# Patient Record
Sex: MALE | Race: WHITE | NOT HISPANIC OR LATINO | Employment: FULL TIME | URBAN - NONMETROPOLITAN AREA
[De-identification: names, ages, dates, MRNs, and addresses within clinical notes are randomized per-mention and may not be internally consistent; named-entity substitution may affect disease eponyms.]

---

## 2018-07-23 ENCOUNTER — OFFICE VISIT (OUTPATIENT)
Dept: FAMILY MEDICINE CLINIC | Facility: CLINIC | Age: 28
End: 2018-07-23

## 2018-07-23 VITALS
SYSTOLIC BLOOD PRESSURE: 112 MMHG | HEIGHT: 70 IN | WEIGHT: 211 LBS | BODY MASS INDEX: 30.21 KG/M2 | HEART RATE: 60 BPM | DIASTOLIC BLOOD PRESSURE: 68 MMHG

## 2018-07-23 DIAGNOSIS — M10.071 ACUTE IDIOPATHIC GOUT INVOLVING TOE OF RIGHT FOOT: Primary | ICD-10-CM

## 2018-07-23 PROCEDURE — 99213 OFFICE O/P EST LOW 20 MIN: CPT | Performed by: INTERNAL MEDICINE

## 2018-07-23 RX ORDER — COLCHICINE 0.6 MG/1
TABLET ORAL
Qty: 20 TABLET | Refills: 0 | Status: SHIPPED | OUTPATIENT
Start: 2018-07-23 | End: 2018-12-10

## 2018-07-23 NOTE — PATIENT INSTRUCTIONS
Low-Purine Diet  Purines are compounds that affect the level of uric acid in your body. A low-purine diet is a diet that is low in purines. Eating a low-purine diet can prevent the level of uric acid in your body from getting too high and causing gout or kidney stones or both.  What do I need to know about this diet?  · Choose low-purine foods. Examples of low-purine foods are listed in the next section.  · Drink plenty of fluids, especially water. Fluids can help remove uric acid from your body. Try to drink 8-16 cups (1.9-3.8 L) a day.  · Limit foods high in fat, especially saturated fat, as fat makes it harder for the body to get rid of uric acid. Foods high in saturated fat include pizza, cheese, ice cream, whole milk, fried foods, and gravies. Choose foods that are lower in fat and lean sources of protein. Use olive oil when cooking as it contains healthy fats that are not high in saturated fat.  · Limit alcohol. Alcohol interferes with the elimination of uric acid from your body. If you are having a gout attack, avoid all alcohol.  · Keep in mind that different people’s bodies react differently to different foods. You will probably learn over time which foods do or do not affect you. If you discover that a food tends to cause your gout to flare up, avoid eating that food. You can more freely enjoy foods that do not cause problems. If you have any questions about a food item, talk to your dietitian or health care provider.  Which foods are low, moderate, and high in purines?  The following is a list of foods that are low, moderate, and high in purines. You can eat any amount of the foods that are low in purines. You may be able to have small amounts of foods that are moderate in purines. Ask your health care provider how much of a food moderate in purines you can have. Avoid foods high in purines.  Grains  · Foods low in purines: Enriched white bread, pasta, rice, cake, cornbread, popcorn.  · Foods moderate in  purines: Whole-grain breads and cereals, wheat germ, bran, oatmeal. Uncooked oatmeal. Dry wheat bran or wheat germ.  · Foods high in purines: Pancakes, [place] toast, biscuits, muffins.  Vegetables  · Foods low in purines: All vegetables, except those that are moderate in purines.  · Foods moderate in purines: Asparagus, cauliflower, spinach, mushrooms, green peas.  Fruits  · All fruits are low in purines.  Meats and other Protein Foods  · Foods low in purines: Eggs, nuts, peanut butter.  · Foods moderate in purines: 80-90% lean beef, lamb, veal, pork, poultry, fish, eggs, peanut butter, nuts. Crab, lobster, oysters, and shrimp. Cooked dried beans, peas, and lentils.  · Foods high in purines: Anchovies, sardines, herring, mussels, tuna, codfish, scallops, trout, and breonna. Harper. Organ meats (such as liver or kidney). Tripe. Game meat. Goose. Sweetbreads.  Dairy  · All dairy foods are low in purines. Low-fat and fat-free dairy products are best because they are low in saturated fat.  Beverages  · Drinks low in purines: Water, carbonated beverages, tea, coffee, cocoa.  · Drinks moderate in purines: Soft drinks and other drinks sweetened with high-fructose corn syrup. Juices. To find whether a food or drink is sweetened with high-fructose corn syrup, look at the ingredients list.  · Drinks high in purines: Alcoholic beverages (such as beer).  Condiments  · Foods low in purines: Salt, herbs, olives, pickles, relishes, vinegar.  · Foods moderate in purines: Butter, margarine, oils, mayonnaise.  Fats and Oils  · Foods low in purines: All types, except gravies and sauces made with meat.  · Foods high in purines: Gravies and sauces made with meat.  Other Foods  · Foods low in purines: Sugars, sweets, gelatin. Cake. Soups made without meat.  · Foods moderate in purines: Meat-based or fish-based soups, broths, or bouillons. Foods and drinks sweetened with high-fructose corn syrup.  · Foods high in purines: High-fat desserts  (such as ice cream, cookies, cakes, pies, doughnuts, and chocolate).  Contact your dietitian for more information on foods that are not listed here.  This information is not intended to replace advice given to you by your health care provider. Make sure you discuss any questions you have with your health care provider.  Document Released: 04/13/2012 Document Revised: 05/25/2017 Document Reviewed: 11/24/2014  [email or web address] Interactive Patient Education © 2017 [employer]    Exercising to Lose Weight  Exercising can help you to lose weight. In order to lose weight through exercise, you need to do vigorous-intensity exercise. You can tell that you are exercising with vigorous intensity if you are breathing very hard and fast and cannot hold a conversation while exercising.  Moderate-intensity exercise helps to maintain your current weight. You can tell that you are exercising at a moderate level if you have a higher heart rate and faster breathing, but you are still able to hold a conversation.  How often should I exercise?  Choose an activity that you enjoy and set realistic goals. Your health care provider can help you to make an activity plan that works for you. Exercise regularly as directed by your health care provider. This may include:  · Doing resistance training twice each week, such as:  ? Push-ups.  ? Sit-ups.  ? Lifting weights.  ? Using resistance bands.  · Doing a given intensity of exercise for a given amount of time. Choose from these options:  ? 150 minutes of moderate-intensity exercise every week.  ? 75 minutes of vigorous-intensity exercise every week.  ? A mix of moderate-intensity and vigorous-intensity exercise every week.    Children, pregnant women, people who are out of shape, people who are overweight, and older adults may need to consult a health care provider for individual recommendations. If you have any sort of medical condition, be sure to consult your health care provider before starting  a new exercise program.  What are some activities that can help me to lose weight?  · Walking at a rate of at least 4.5 miles an hour.  · Jogging or running at a rate of 5 miles per hour.  · Biking at a rate of at least 10 miles per hour.  · Lap swimming.  · Roller-skating or in-line skating.  · Cross-country skiing.  · Vigorous competitive sports, such as football, basketball, and soccer.  · Jumping rope.  · Aerobic dancing.  How can I be more active in my day-to-day activities?  · Use the stairs instead of the elevator.  · Take a walk during your lunch break.  · If you drive, park your car farther away from work or school.  · If you take public transportation, get off one stop early and walk the rest of the way.  · Make all of your phone calls while standing up and walking around.  · Get up, stretch, and walk around every 30 minutes throughout the day.  What guidelines should I follow while exercising?  · Do not exercise so much that you hurt yourself, feel dizzy, or get very short of breath.  · Consult your health care provider prior to starting a new exercise program.  · Wear comfortable clothes and shoes with good support.  · Drink plenty of water while you exercise to prevent dehydration or heat stroke. Body water is lost during exercise and must be replaced.  · Work out until you breathe faster and your heart beats faster.  This information is not intended to replace advice given to you by your health care provider. Make sure you discuss any questions you have with your health care provider.  Document Released: 01/20/2012 Document Revised: 05/25/2017 Document Reviewed: 05/21/2015  [employer] Interactive Patient Education © 2018 [employer]

## 2018-07-23 NOTE — PROGRESS NOTES
"Chief Complaint   Patient presents with   • Toe Pain     pain in Rt great toe x x 1 week     Subjective   Trenton Mcclendon is a 27 y.o. male who presents to the office complaining of Pain in the right great toe.  This started approximately one week ago.  He does not have any prior history of gout.  He denies any injury to the toe.  He was seen in the urgent care center over the weekend.  He had an x-ray which was negative.  He was given indomethacin, although he has not yet picked up the prescription.  He was also given an injection of an anti-inflammatory medication.  He reports that the toe has improved.  Initially his pain was at 10 and today it is down to a 3.     The following portions of the patient's history were reviewed and updated as appropriate: allergies, current medications, past family history, past medical history, past social history, past surgical history and problem list.    Review of Systems   Constitutional: Negative for chills, fatigue and fever.   HENT: Negative for congestion, sneezing, sore throat and trouble swallowing.    Eyes: Negative for visual disturbance.   Respiratory: Negative for cough, chest tightness, shortness of breath and wheezing.    Cardiovascular: Negative for chest pain, palpitations and leg swelling.   Gastrointestinal: Negative for abdominal pain, constipation, diarrhea, nausea and vomiting.   Genitourinary: Negative for dysuria, frequency and urgency.   Musculoskeletal: Negative for neck pain.   Skin: Negative for rash.   Neurological: Negative for dizziness, weakness and headaches.   Psychiatric/Behavioral:        Patient denies any feelings of depression and has not felt down, hopeless or lost interest in any activities.   All other systems reviewed and are negative.      Objective   Vitals:    07/23/18 1528   BP: 112/68   BP Location: Left arm   Patient Position: Sitting   Cuff Size: Adult   Pulse: 60   Weight: 95.7 kg (211 lb)   Height: 177.8 cm (70\")   PainSc:   " 3   PainLoc: Toe  Comment: Rt great toe     Physical Exam   Constitutional: He is oriented to person, place, and time. He appears well-developed and well-nourished. No distress.   HENT:   Head: Normocephalic and atraumatic.   Nose: Nose normal.   Mouth/Throat: Oropharynx is clear and moist. No oropharyngeal exudate.   Eyes: Pupils are equal, round, and reactive to light. Conjunctivae and EOM are normal. No scleral icterus.   Neck: Normal range of motion. Neck supple.   Cardiovascular: Normal rate, regular rhythm and normal heart sounds.  Exam reveals no gallop and no friction rub.    No murmur heard.  Pulmonary/Chest: Effort normal and breath sounds normal. No respiratory distress. He has no wheezes. He has no rales.   Abdominal: Soft. Bowel sounds are normal. He exhibits no distension. There is no tenderness. There is no rebound and no guarding.   Musculoskeletal: Normal range of motion. He exhibits no edema.   There is no swelling or erythema related to the right great toe.  He has full range of motion of the toe.   Lymphadenopathy:     He has no cervical adenopathy.   Neurological: He is alert and oriented to person, place, and time. No cranial nerve deficit.   Skin: Skin is warm and dry. No rash noted.   Psychiatric: He has a normal mood and affect. His behavior is normal. Judgment and thought content normal.   Nursing note and vitals reviewed.      Assessment/Plan   Trenton was seen today for toe pain.    Diagnoses and all orders for this visit:    Acute idiopathic gout involving toe of right foot  -     colchicine 0.6 MG tablet; Take 1 tablet at the onset of gout attack. May repeat q 6 hours up to 3 doses.    His symptoms seem to be gout related.  I discussed checking a uric acid level, however we will hold off for now given that his symptoms have improved and he has received the anti-inflammatory medication.  He is given colchicine to use as needed for an acute flareup in his gout.  He may also use indomethacin  as needed.  I have given him dietary information related to a low purine diet.  If he starts having frequent flareups, he may benefit from a prophylactic medication.  He will let me know if he has any further gout attacks.         PHQ-2/PHQ-9 Depression Screening 7/23/2018   Little interest or pleasure in doing things 0   Feeling down, depressed, or hopeless 0   Total Score 0

## 2018-12-10 ENCOUNTER — LAB (OUTPATIENT)
Dept: LAB | Facility: OTHER | Age: 28
End: 2018-12-10

## 2018-12-10 ENCOUNTER — TELEPHONE (OUTPATIENT)
Dept: FAMILY MEDICINE CLINIC | Facility: CLINIC | Age: 28
End: 2018-12-10

## 2018-12-10 ENCOUNTER — OFFICE VISIT (OUTPATIENT)
Dept: FAMILY MEDICINE CLINIC | Facility: CLINIC | Age: 28
End: 2018-12-10

## 2018-12-10 VITALS
HEART RATE: 67 BPM | TEMPERATURE: 98.2 F | BODY MASS INDEX: 30.21 KG/M2 | WEIGHT: 211 LBS | OXYGEN SATURATION: 99 % | RESPIRATION RATE: 16 BRPM | DIASTOLIC BLOOD PRESSURE: 82 MMHG | HEIGHT: 70 IN | SYSTOLIC BLOOD PRESSURE: 124 MMHG

## 2018-12-10 DIAGNOSIS — I73.00 RAYNAUD'S DISEASE WITHOUT GANGRENE: ICD-10-CM

## 2018-12-10 DIAGNOSIS — I73.00 RAYNAUD'S DISEASE WITHOUT GANGRENE: Primary | ICD-10-CM

## 2018-12-10 LAB
BASOPHILS # BLD AUTO: 0.05 10*3/MM3 (ref 0–0.2)
BASOPHILS NFR BLD AUTO: 0.9 % (ref 0–2)
DEPRECATED RDW RBC AUTO: 41.2 FL (ref 35.1–43.9)
EOSINOPHIL # BLD AUTO: 0.17 10*3/MM3 (ref 0–0.7)
EOSINOPHIL NFR BLD AUTO: 3.1 % (ref 0–7)
ERYTHROCYTE [DISTWIDTH] IN BLOOD BY AUTOMATED COUNT: 12.7 % (ref 11.5–14.5)
HCT VFR BLD AUTO: 46.5 % (ref 39–49)
HGB BLD-MCNC: 16.1 G/DL (ref 13.7–17.3)
LYMPHOCYTES # BLD AUTO: 1.87 10*3/MM3 (ref 0.6–4.2)
LYMPHOCYTES NFR BLD AUTO: 33.8 % (ref 10–50)
MCH RBC QN AUTO: 30.8 PG (ref 26.5–34)
MCHC RBC AUTO-ENTMCNC: 34.6 G/DL (ref 31.5–36.3)
MCV RBC AUTO: 88.9 FL (ref 80–98)
MONOCYTES # BLD AUTO: 0.65 10*3/MM3 (ref 0–0.9)
MONOCYTES NFR BLD AUTO: 11.7 % (ref 0–12)
NEUTROPHILS # BLD AUTO: 2.8 10*3/MM3 (ref 2–8.6)
NEUTROPHILS NFR BLD AUTO: 50.5 % (ref 37–80)
PLATELET # BLD AUTO: 268 10*3/MM3 (ref 150–450)
PMV BLD AUTO: 10.1 FL (ref 8–12)
RBC # BLD AUTO: 5.23 10*6/MM3 (ref 4.37–5.74)
WBC NRBC COR # BLD: 5.54 10*3/MM3 (ref 3.2–9.8)

## 2018-12-10 PROCEDURE — 99213 OFFICE O/P EST LOW 20 MIN: CPT | Performed by: PHYSICIAN ASSISTANT

## 2018-12-10 PROCEDURE — 85025 COMPLETE CBC W/AUTO DIFF WBC: CPT | Performed by: PHYSICIAN ASSISTANT

## 2018-12-10 PROCEDURE — 86038 ANTINUCLEAR ANTIBODIES: CPT | Performed by: PHYSICIAN ASSISTANT

## 2018-12-10 PROCEDURE — 36415 COLL VENOUS BLD VENIPUNCTURE: CPT | Performed by: PHYSICIAN ASSISTANT

## 2018-12-10 NOTE — PROGRESS NOTES
Subjective   Trenton Mcclendon is a 28 y.o. male.     History of Present Illness   Pt is here today due to a hand issue. Pt states his fingers and hands change colors (dark purple, blue, pink) with weather changes, especially cold. Denies skin changes during times of emotional stress. Pt states within 15 minutes of being outside his skin on his fingers and hands changes color. Denies hx of smoking. Pt states cold temperatures make his hands sensitive to touch, but not painful. Pt states the color changes last the duration he is outside and 30 minutes after he has returned inside. Denies skin color changes to his feet and toes. Denies hx of anemia. Admits to full ROM of fingers/hands bilaterally. Denies stiffness to joints of the bilateral hands and fingers. Denies swelling to hands/fingers. Denies numbness, tingling, and pain to bilateral hands and fingers.  The following portions of the patient's history were reviewed and updated as appropriate: allergies, current medications, past family history, past medical history, past social history, past surgical history and problem list.    Review of Systems   Constitutional: Negative for activity change, fatigue and fever.   Respiratory: Negative for apnea, cough, choking, chest tightness, shortness of breath, wheezing and stridor.    Cardiovascular: Negative for chest pain, palpitations and leg swelling.   Skin: Positive for color change (to bilateral hands/fingers in weather changes). Negative for dry skin, pallor, rash, skin lesions and bruise.   Neurological: Negative for dizziness, weakness, light-headedness, headache and confusion.   Hematological: Does not bruise/bleed easily.       Objective   Physical Exam   Constitutional: He is oriented to person, place, and time. He appears well-developed and well-nourished. No distress.   HENT:   Head: Normocephalic and atraumatic.   Cardiovascular: Normal rate, regular rhythm, normal heart sounds and intact distal pulses.  Exam reveals no gallop and no friction rub.   No murmur heard.  Pulmonary/Chest: Effort normal and breath sounds normal. No stridor. No respiratory distress. He has no wheezes. He has no rales. He exhibits no tenderness.   Musculoskeletal: Normal range of motion.        Right wrist: Normal.        Left wrist: Normal.   Full ROM of bilateral hands and fingers. Negative tinel's and phalen's signs. Neurovascular of bilateral hands and fingers intact.     Neurological: He is alert and oriented to person, place, and time.   Skin: Skin is warm, dry and intact. Capillary refill takes less than 2 seconds. Turgor is normal. No abrasion, no bruising, no burn, no ecchymosis, no laceration, no lesion, no petechiae and no rash noted. He is not diaphoretic. No cyanosis or erythema. Nails show no clubbing.   Nursing note and vitals reviewed.        Assessment/Plan   Trenton was seen today for circulatory problem.    Diagnoses and all orders for this visit:    Raynaud's disease without gangrene  -     CBC & Differential; Future  -     CHERYL; Future      Pt advised to avoid exacerbating factors to raynaud's disease such as temperature extremes, emotional stress, or smoking. CBC and CHERYL ordered to rule out anemia and assess for accompanying rheumatologic disease and will call pt with results. Discussed pharmacologic treatment in the future if symptoms cannot be controlled by avoidance of exacerbating factors or if pain, numbness, and tingling develop.  Patient educated to follow up sooner than next scheduled appointment if symptoms worsen. Patient stated understanding and has agreed with plan of care. After visit summary was printed and given to patient.         This document has been electronically signed by Kaylene Leo PA-C on December 10, 2018 10:49 AM,.

## 2018-12-10 NOTE — PROGRESS NOTES
CBC - within normal limits. Please call pt and let him know. Still awaiting CHERYL, but this will take a few days to get back.

## 2018-12-12 ENCOUNTER — TELEPHONE (OUTPATIENT)
Dept: FAMILY MEDICINE CLINIC | Facility: CLINIC | Age: 28
End: 2018-12-12

## 2018-12-12 LAB — ANA SER QL: NEGATIVE

## 2018-12-12 NOTE — TELEPHONE ENCOUNTER
----- Message from Kaylene Leo PA-C sent at 12/12/2018 12:15 PM CST -----  CHERYL negative. Please call and let pt know.

## 2019-02-22 ENCOUNTER — PRIOR AUTHORIZATION (OUTPATIENT)
Dept: FAMILY MEDICINE CLINIC | Facility: CLINIC | Age: 29
End: 2019-02-22

## 2019-02-22 ENCOUNTER — OFFICE VISIT (OUTPATIENT)
Dept: FAMILY MEDICINE CLINIC | Facility: CLINIC | Age: 29
End: 2019-02-22

## 2019-02-22 VITALS
TEMPERATURE: 98.4 F | WEIGHT: 208 LBS | DIASTOLIC BLOOD PRESSURE: 74 MMHG | SYSTOLIC BLOOD PRESSURE: 120 MMHG | HEIGHT: 69 IN | BODY MASS INDEX: 30.81 KG/M2 | HEART RATE: 95 BPM | OXYGEN SATURATION: 99 %

## 2019-02-22 DIAGNOSIS — B35.6 JOCK ITCH: ICD-10-CM

## 2019-02-22 PROCEDURE — 99213 OFFICE O/P EST LOW 20 MIN: CPT | Performed by: PHYSICIAN ASSISTANT

## 2019-02-22 RX ORDER — SULCONAZOLE NITRATE 10 MG/G
CREAM TOPICAL 2 TIMES DAILY
Qty: 60 G | Refills: 1 | Status: SHIPPED | OUTPATIENT
Start: 2019-02-22 | End: 2019-03-15

## 2019-02-22 NOTE — TELEPHONE ENCOUNTER
Sent in clotrimazole 1% cream insurance covered that rx instead of exelderm 1% it was ok with Felipa she confirmed the change 02/22/2019. No pa was sent since it was changed to what the insurance wanted.

## 2019-02-22 NOTE — PROGRESS NOTES
Subjective   Trenton Mcclendon is a 28 y.o. male.     Rash   This is a new problem. The current episode started in the past 7 days. The problem has been gradually worsening since onset. The affected locations include the groin. The rash is characterized by itchiness and redness. Pertinent negatives include no anorexia, congestion, cough, diarrhea, eye pain, facial edema, fatigue, fever, joint pain, nail changes, rhinorrhea, shortness of breath, sore throat or vomiting. Past treatments include nothing. The treatment provided no relief. There is no history of allergies, asthma, eczema or varicella.      Pt presents today with a c/c of rash x 1 week of bilateral groin. Pt admits the rash is mildly pruritic.   The following portions of the patient's history were reviewed and updated as appropriate: allergies, current medications, past family history, past medical history, past social history, past surgical history and problem list.    Review of Systems   Constitutional: Negative for activity change, appetite change, chills, diaphoresis, fatigue, fever, unexpected weight gain and unexpected weight loss.   HENT: Negative.  Negative for congestion, rhinorrhea and sore throat.    Eyes: Negative for blurred vision, double vision, pain and visual disturbance.   Respiratory: Negative for apnea, cough, choking, chest tightness, shortness of breath, wheezing and stridor.    Cardiovascular: Negative for chest pain, palpitations and leg swelling.   Gastrointestinal: Negative for abdominal distention, abdominal pain, anorexia, constipation, diarrhea, nausea, vomiting, GERD and indigestion.   Endocrine: Negative.    Musculoskeletal: Negative for arthralgias, back pain, gait problem, joint pain, joint swelling, myalgias, neck pain and bursitis.   Skin: Positive for color change (erythematous groin bilaterally) and rash. Negative for dry skin, nail changes, pallor, skin lesions and bruise.   Neurological: Negative for dizziness,  tremors, seizures, syncope, facial asymmetry, speech difficulty, weakness, light-headedness, numbness, headache, memory problem and confusion.   Psychiatric/Behavioral: Negative.        Objective   Physical Exam   Constitutional: He is oriented to person, place, and time. Vital signs are normal. He appears well-developed and well-nourished. He is active and cooperative.  Non-toxic appearance. He does not have a sickly appearance. He does not appear ill. No distress.   HENT:   Head: Normocephalic and atraumatic.   Right Ear: External ear normal.   Left Ear: External ear normal.   Nose: Nose normal.   Mouth/Throat: Oropharynx is clear and moist. No oropharyngeal exudate.   Eyes: Conjunctivae and EOM are normal. Pupils are equal, round, and reactive to light.   Neck: Normal range of motion. Neck supple.   Cardiovascular: Normal rate, regular rhythm and normal heart sounds. Exam reveals no gallop and no friction rub.   No murmur heard.  Pulmonary/Chest: Effort normal and breath sounds normal. No stridor. No respiratory distress. He has no wheezes. He has no rales. He exhibits no tenderness.   Abdominal: Soft. Bowel sounds are normal. He exhibits no distension and no mass. There is no tenderness. There is no rebound and no guarding. No hernia.   Musculoskeletal: Normal range of motion.   Lymphadenopathy:     He has no cervical adenopathy.   Neurological: He is alert and oriented to person, place, and time.   Skin: Skin is warm, dry and intact. Capillary refill takes less than 2 seconds. Rash noted. Rash is macular. He is not diaphoretic. There is erythema (bilateral groin). No pallor.        Psychiatric: He has a normal mood and affect. His behavior is normal. Judgment and thought content normal.   Nursing note and vitals reviewed.    Vitals:    02/22/19 0941   BP: 120/74   Pulse: 95   Temp: 98.4 °F (36.9 °C)   SpO2: 99%           Assessment/Plan   Trenton was seen today for rash.    Diagnoses and all orders for this  visit:    Jock itch  -     sulconazole 1 % cream; Apply  topically to the appropriate area as directed 2 (Two) Times a Day for 21 days.    Jock itch - prescription for sulconazole, as above sent to pharmacy. Advised pt to apply cream topically to groin bid x 21 days. Advised pt to keep this area clean & dry. Advised pt to wear loose fitting clothing. Advised pt to avoid scratching this area.     Patient educated to follow up sooner than next scheduled appointment if symptoms worsen or do not improve. Patient stated understanding and has agreed with plan of care. After visit summary was printed and given to patient.       This document has been electronically signed by Kaylene Leo PA-C on February 22, 2019 9:35 PM,.

## 2020-05-15 ENCOUNTER — OFFICE VISIT (OUTPATIENT)
Dept: FAMILY MEDICINE CLINIC | Facility: CLINIC | Age: 30
End: 2020-05-15

## 2020-05-15 ENCOUNTER — LAB (OUTPATIENT)
Dept: LAB | Facility: OTHER | Age: 30
End: 2020-05-15

## 2020-05-15 VITALS
TEMPERATURE: 97.4 F | BODY MASS INDEX: 36.79 KG/M2 | WEIGHT: 257 LBS | HEIGHT: 70 IN | DIASTOLIC BLOOD PRESSURE: 70 MMHG | SYSTOLIC BLOOD PRESSURE: 124 MMHG | HEART RATE: 65 BPM

## 2020-05-15 DIAGNOSIS — R53.83 OTHER FATIGUE: ICD-10-CM

## 2020-05-15 DIAGNOSIS — B36.0 TINEA VERSICOLOR: Primary | ICD-10-CM

## 2020-05-15 DIAGNOSIS — N48.1 BALANITIS: ICD-10-CM

## 2020-05-15 LAB
ALBUMIN SERPL-MCNC: 4.6 G/DL (ref 3.5–5)
ALBUMIN/GLOB SERPL: 1.4 G/DL (ref 1.1–1.8)
ALP SERPL-CCNC: 62 U/L (ref 38–126)
ALT SERPL W P-5'-P-CCNC: 41 U/L
ANION GAP SERPL CALCULATED.3IONS-SCNC: 6 MMOL/L (ref 5–15)
AST SERPL-CCNC: 35 U/L (ref 17–59)
BASOPHILS # BLD MANUAL: 0.1 10*3/MM3 (ref 0–0.2)
BASOPHILS NFR BLD AUTO: 1 % (ref 0–1.5)
BILIRUB SERPL-MCNC: 0.2 MG/DL (ref 0.2–1.3)
BUN BLD-MCNC: 17 MG/DL (ref 7–23)
BUN/CREAT SERPL: 19.5 (ref 7–25)
CALCIUM SPEC-SCNC: 9.7 MG/DL (ref 8.4–10.2)
CHLORIDE SERPL-SCNC: 104 MMOL/L (ref 101–112)
CO2 SERPL-SCNC: 30 MMOL/L (ref 22–30)
CREAT BLD-MCNC: 0.87 MG/DL (ref 0.7–1.3)
DEPRECATED RDW RBC AUTO: 40.2 FL (ref 37–54)
EOSINOPHIL # BLD MANUAL: 0.41 10*3/MM3 (ref 0–0.4)
EOSINOPHIL NFR BLD MANUAL: 4 % (ref 0.3–6.2)
ERYTHROCYTE [DISTWIDTH] IN BLOOD BY AUTOMATED COUNT: 12.6 % (ref 12.3–15.4)
GFR SERPL CREATININE-BSD FRML MDRD: 104 ML/MIN/1.73 (ref 77–179)
GLOBULIN UR ELPH-MCNC: 3.4 GM/DL (ref 2.3–3.5)
GLUCOSE BLD-MCNC: 86 MG/DL (ref 70–99)
HCT VFR BLD AUTO: 46 % (ref 37.5–51)
HGB BLD-MCNC: 16.4 G/DL (ref 13–17.7)
LYMPHOCYTES # BLD MANUAL: 2.07 10*3/MM3 (ref 0.7–3.1)
LYMPHOCYTES NFR BLD MANUAL: 20 % (ref 19.6–45.3)
LYMPHOCYTES NFR BLD MANUAL: 9 % (ref 5–12)
MCH RBC QN AUTO: 31.3 PG (ref 26.6–33)
MCHC RBC AUTO-ENTMCNC: 35.7 G/DL (ref 31.5–35.7)
MCV RBC AUTO: 87.8 FL (ref 79–97)
MONOCYTES # BLD AUTO: 0.93 10*3/MM3 (ref 0.1–0.9)
NEUTROPHILS # BLD AUTO: 6.84 10*3/MM3 (ref 1.7–7)
NEUTROPHILS NFR BLD MANUAL: 66 % (ref 42.7–76)
PLATELET # BLD AUTO: 296 10*3/MM3 (ref 140–450)
PMV BLD AUTO: 10.2 FL (ref 6–12)
POTASSIUM BLD-SCNC: 4.3 MMOL/L (ref 3.4–5)
PROT SERPL-MCNC: 8 G/DL (ref 6.3–8.6)
RBC # BLD AUTO: 5.24 10*6/MM3 (ref 4.14–5.8)
RBC MORPH BLD: NORMAL
SMALL PLATELETS BLD QL SMEAR: ADEQUATE
SODIUM BLD-SCNC: 140 MMOL/L (ref 137–145)
WBC MORPH BLD: NORMAL
WBC NRBC COR # BLD: 10.36 10*3/MM3 (ref 3.4–10.8)

## 2020-05-15 PROCEDURE — 84443 ASSAY THYROID STIM HORMONE: CPT | Performed by: INTERNAL MEDICINE

## 2020-05-15 PROCEDURE — 36415 COLL VENOUS BLD VENIPUNCTURE: CPT | Performed by: INTERNAL MEDICINE

## 2020-05-15 PROCEDURE — 84439 ASSAY OF FREE THYROXINE: CPT | Performed by: INTERNAL MEDICINE

## 2020-05-15 PROCEDURE — 80053 COMPREHEN METABOLIC PANEL: CPT | Performed by: INTERNAL MEDICINE

## 2020-05-15 PROCEDURE — 84403 ASSAY OF TOTAL TESTOSTERONE: CPT | Performed by: INTERNAL MEDICINE

## 2020-05-15 PROCEDURE — 99213 OFFICE O/P EST LOW 20 MIN: CPT | Performed by: INTERNAL MEDICINE

## 2020-05-15 PROCEDURE — 82306 VITAMIN D 25 HYDROXY: CPT | Performed by: INTERNAL MEDICINE

## 2020-05-15 PROCEDURE — 84402 ASSAY OF FREE TESTOSTERONE: CPT | Performed by: INTERNAL MEDICINE

## 2020-05-15 PROCEDURE — 85025 COMPLETE CBC W/AUTO DIFF WBC: CPT | Performed by: INTERNAL MEDICINE

## 2020-05-15 RX ORDER — TERBINAFINE HYDROCHLORIDE 250 MG/1
250 TABLET ORAL DAILY
Qty: 10 TABLET | Refills: 0 | Status: SHIPPED | OUTPATIENT
Start: 2020-05-15

## 2020-05-15 RX ORDER — NYSTATIN 100000 U/G
CREAM TOPICAL 2 TIMES DAILY
Qty: 30 G | Refills: 1 | Status: SHIPPED | OUTPATIENT
Start: 2020-05-15

## 2020-05-15 RX ORDER — KETOCONAZOLE 20 MG/ML
SHAMPOO TOPICAL 2 TIMES WEEKLY
Qty: 120 ML | Refills: 5 | Status: SHIPPED | OUTPATIENT
Start: 2020-05-18

## 2020-05-15 NOTE — PROGRESS NOTES
Chief Complaint   Patient presents with   • Rash     chest, shoulders, sides, seems to bother him when he is hot     Subjective   Trenton Mcclendon is a 29 y.o. male who presents to the office complaining of rash.  This has been present for approximately 1 month.  It is present on his chest shoulders and abdomen.  It tends to flareup and gets darker when he is hot or when he gets out of the shower.  He has not used any medications for this.  Most of the time, it does not itch or bother him.    He also complains of increased fatigue.  He has not had any labs in quite some time.  He is concerned about testosterone level.    He complains of sensitivity and irritation around the glans of the penis.  This has been bothering him off and on for several months.  He has noticed several small pinpoint size bumps, but those have been there for a number of years.  He is unsure if these are related to the increased sensitivity.    The following portions of the patient's history were reviewed and updated as appropriate: allergies, current medications, past family history, past medical history, past social history, past surgical history and problem list.    Review of Systems   Constitutional: Negative for chills, fatigue and fever.   HENT: Negative for congestion, sneezing, sore throat and trouble swallowing.    Eyes: Negative for visual disturbance.   Respiratory: Negative for cough, chest tightness, shortness of breath and wheezing.    Cardiovascular: Negative for chest pain, palpitations and leg swelling.   Gastrointestinal: Negative for abdominal pain, constipation, diarrhea, nausea and vomiting.   Genitourinary: Negative for dysuria, frequency and urgency.   Musculoskeletal: Negative for neck pain.   Skin: Positive for rash.   Neurological: Negative for dizziness, weakness and headaches.   Psychiatric/Behavioral:        Patient denies any feelings of depression and has not felt down, hopeless or lost interest in any  "activities.   All other systems reviewed and are negative.      Objective   Vitals:    05/15/20 1440   BP: 124/70   BP Location: Left arm   Patient Position: Sitting   Cuff Size: Adult   Pulse: 65   Temp: 97.4 °F (36.3 °C)   TempSrc: Oral   Weight: 117 kg (257 lb)   Height: 177.8 cm (70\")   PainSc: 0-No pain     Body mass index is 36.88 kg/m².  Physical Exam   Constitutional: He is oriented to person, place, and time. He appears well-developed and well-nourished. No distress.   HENT:   Head: Normocephalic and atraumatic.   Nose: Nose normal.   Mouth/Throat: Oropharynx is clear and moist. No oropharyngeal exudate.   Eyes: Pupils are equal, round, and reactive to light. Conjunctivae and EOM are normal. No scleral icterus.   Neck: Normal range of motion. Neck supple.   Cardiovascular: Normal rate, regular rhythm and normal heart sounds. Exam reveals no gallop and no friction rub.   No murmur heard.  Pulmonary/Chest: Effort normal and breath sounds normal. No respiratory distress. He has no wheezes. He has no rales.   Abdominal: Soft. Bowel sounds are normal. He exhibits no distension. There is no tenderness. There is no rebound and no guarding.   Genitourinary: Uncircumcised. Penile erythema present.   Genitourinary Comments: There is some erythema present around the glans.  The foreskin is easily retracted.   Musculoskeletal: Normal range of motion. He exhibits no edema.   Lymphadenopathy:     He has no cervical adenopathy.   Neurological: He is alert and oriented to person, place, and time. No cranial nerve deficit.   Skin: Skin is warm and dry. No rash noted.   Psychiatric: He has a normal mood and affect. His behavior is normal. Judgment and thought content normal.   Nursing note and vitals reviewed.      Assessment/Plan   Trenton was seen today for rash.    Diagnoses and all orders for this visit:    Tinea versicolor  -     ketoconazole (NIZORAL) 2 % shampoo; Apply  topically to the appropriate area as directed 2 " (Two) Times a Week.  -     terbinafine (lamiSIL) 250 MG tablet; Take 1 tablet by mouth Daily.    Other fatigue  -     CBC & Differential; Future  -     Comprehensive Metabolic Panel; Future  -     Testosterone (Free & Total), LC / MS; Future  -     TSH; Future  -     T4, Free; Future  -     Vitamin D 25 Hydroxy; Future    Balanitis  -     nystatin (MYCOSTATIN) 016140 UNIT/GM cream; Apply  topically to the appropriate area as directed 2 (Two) Times a Day.    The rash on his chest and abdomen is consistent with tinea versicolor.  He is given ketoconazole shampoo for treatment of this.  He is also given Lamisil tablets to take daily for 10 days.  He is given nystatin cream to use for the balanitis.  He will try to keep the glans area clean and dry.  If this does not improve, he will need referral to urology.  I will order labs for evaluation of his fatigue.  He will stop by the lab and have these drawn today.  I will follow-up with him once I have results.    Patient's Body mass index is 36.88 kg/m². BMI is above normal parameters. Recommendations include: exercise counseling and nutrition counseling.         PHQ-2/PHQ-9 Depression Screening 5/15/2020   Little interest or pleasure in doing things 0   Feeling down, depressed, or hopeless 0   Total Score 0

## 2020-05-16 LAB
25(OH)D3 SERPL-MCNC: 20.4 NG/ML (ref 30–100)
T4 FREE SERPL-MCNC: 1.31 NG/DL (ref 0.93–1.7)
TSH SERPL DL<=0.05 MIU/L-ACNC: 2.3 UIU/ML (ref 0.27–4.2)

## 2020-05-19 LAB
TESTOST FREE SERPL-MCNC: 13.3 PG/ML (ref 9.3–26.5)
TESTOST SERPL-MCNC: 535.5 NG/DL (ref 264–916)